# Patient Record
Sex: FEMALE | Race: BLACK OR AFRICAN AMERICAN | NOT HISPANIC OR LATINO | URBAN - METROPOLITAN AREA
[De-identification: names, ages, dates, MRNs, and addresses within clinical notes are randomized per-mention and may not be internally consistent; named-entity substitution may affect disease eponyms.]

---

## 2017-02-21 ENCOUNTER — IMPORTED ENCOUNTER (OUTPATIENT)
Dept: URBAN - METROPOLITAN AREA CLINIC 1 | Facility: CLINIC | Age: 82
End: 2017-02-21

## 2017-02-21 PROBLEM — Z96.1: Noted: 2017-02-21

## 2017-02-21 PROBLEM — H04.123: Noted: 2017-02-21

## 2017-02-21 PROBLEM — H40.1133: Noted: 2017-02-21

## 2017-02-21 PROBLEM — H26.491: Noted: 2017-02-21

## 2017-02-21 PROBLEM — H16.143: Noted: 2017-02-21

## 2017-02-21 PROCEDURE — 92012 INTRM OPH EXAM EST PATIENT: CPT

## 2017-02-21 NOTE — PATIENT DISCUSSION
1.  End Stage Open Angle Glaucoma OU- (.95 OD/ .85 OS)Slight increased IOP OU w/ partial compliance. H/o ALT OU. Patient to continue with Brimonidine OU TID Cosopt OU TID Latanoprost OU QHS. Compliance with meds discussed. 2.  BILLIE w/ PEK OU- Cont ATs TID OU Routinely. 3.  Pseudophakia OU- h/o YAG Cap OS 4. PCO OD- Stable. Observe  5. Return for an appointment for a 10 in 4 months with Dr. Lissette Holm.

## 2017-06-26 NOTE — PATIENT DISCUSSION
1.  Hyperopia OU- Rx for glasses given. 2.  COAG Suspect OU (0.7 OU) : Past w/u negative. Cont to observe. 3.  AVIS w/ PEK OU- Cont ATs BID OU Routinely. 4.  Cataract OU: Observe for now without intervention. The patient was advised to contact us if any change or worsening of visionReturn for an appointment in 1 yr 36 with Dr. Feng Haque.

## 2017-06-30 ENCOUNTER — IMPORTED ENCOUNTER (OUTPATIENT)
Dept: URBAN - METROPOLITAN AREA CLINIC 1 | Facility: CLINIC | Age: 82
End: 2017-06-30

## 2017-06-30 PROBLEM — H35.033: Noted: 2017-06-30

## 2017-06-30 PROBLEM — H40.1134: Noted: 2017-06-30

## 2017-06-30 PROCEDURE — 92012 INTRM OPH EXAM EST PATIENT: CPT

## 2017-06-30 NOTE — PATIENT DISCUSSION
1.  End Stage Open Angle Glaucoma OU- (.95 OD/ .85 OS) Slight increased IOP OU w/ still partial compliance and decrease in [place]. H/o ALT OU. Patient to continue with Brimonidine OU TID Cosopt OU TID Latanoprost OU QHS. Compliance with meds discussed. Risk of permanent vision loss discussed with patient due to noncompliance. Patient understood. 2.  BILLIE w/ PEK OU- Cont ATs TID OU Routinely. 3.  Pseudophakia OU- h/o YAG Cap OS 4. PCO OD- Stable. Observe  5. H/o CRVO OD/ PRP/ Focal Laser 6. 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 Serge Stone.

## 2018-01-16 NOTE — PATIENT DISCUSSION
1.  Chalazion right upper eyelid - on Tarsal plate. Hot compresses TID x 5 minutes for 3 weeks. If without improvement discussed with patient possible Incision and Drainage procedure. Risks and benefits discussed with patient and patient states full understanding. 2. Pinguecula OD - Begin Tobradex QID OD (erx). Advised using sunglasses when exposed to UV light. 3.  AVIS w/ PEK OU-The use/continuation of artificial tears were recommended. 4.  Cataract OU: Observe for now without intervention. The patient was advised to contact us if any change or worsening of vision5. COAG Suspect OU (0.7 OU) : Past w/u negative. Return for an appointment in 1 week 10 with Dr. Preciado.

## 2018-01-23 NOTE — PATIENT DISCUSSION
1.  Chalazion right upper eyelid - Improved. Hot compresses TID x 5 minutes for 3 weeks. If without improvement discussed with patient possible Incision and Drainage procedure. Risks and benefits discussed with patient and patient states full understanding. 2. Pinguecula OD -- Inflammation resolved. Decrease Tobradex QID OD x 2 weeks then DC. Use of sunglasses when exposed to UV light and observation is recommended. 3. AVIS w/ PEK OU- Recommend ATs BID-QID OU routinely 4. Cataract OU: Observe for now without intervention. The patient was advised to contact us if any change or worsening of vision5. COAG Suspect OU (0.7 OU) : Past w/u negative. Return for an appointment in 1 month 10 with Dr. Debbi Cruz.

## 2018-01-29 ENCOUNTER — IMPORTED ENCOUNTER (OUTPATIENT)
Dept: URBAN - METROPOLITAN AREA CLINIC 1 | Facility: CLINIC | Age: 83
End: 2018-01-29

## 2018-01-29 PROBLEM — Z96.1: Noted: 2018-01-29

## 2018-01-29 PROBLEM — H04.123: Noted: 2018-01-29

## 2018-01-29 PROBLEM — H40.1133: Noted: 2018-01-29

## 2018-01-29 PROBLEM — H16.143: Noted: 2018-01-29

## 2018-01-29 PROBLEM — H35.033: Noted: 2018-01-29

## 2018-01-29 PROBLEM — H26.493: Noted: 2018-01-29

## 2018-01-29 PROCEDURE — 92014 COMPRE OPH EXAM EST PT 1/>: CPT

## 2018-01-29 PROCEDURE — 92133 CPTRZD OPH DX IMG PST SGM ON: CPT

## 2018-01-29 NOTE — PATIENT DISCUSSION
1.  End Stage Open Angle Glaucoma OU (CD 0.95/0.85) - IOP elevated due to gtts on backorder. Cont Brimonidine OU TID Cosopt OU TID Latanoprost OU QHS. H/o ALT OU. Patient advised to be compliant with gtts. Condition was discussed with patient and patient understands. Will continue to monitor patient for any progression in condition. Patient was advised to call us with any problems questions or concerns. 2.  BILLIE w/ PEK OU-The use/continuation of artificial tears were recommended. 3.  PCO OU: (Posterior Capsule Opacification)   Observe and consider yag cap when pt feels pco visually significant and visual acuity decreases to appropriate level. 4. Pseudophakia OU 5.  GR II Hypertensive Retinopathy OU-Stable continue HTN Control6. H/o CRVO OD/ PRP/ Focal LaserReturn for an appointment in 3 months 10 with Dr. Neha Roca.

## 2018-02-20 NOTE — PATIENT DISCUSSION
1.  Chalazion right upper eyelid - Improved. Restart Tobradex BID OD (erx). Hot compresses TID x 5 minutes for 3 weeks. If without improvement discussed with patient possible Incision and Drainage procedure. Risks and benefits discussed with patient and patient states full understanding. 2. Pinguecula OD -- Use of sunglasses when exposed to UV light and observation is recommended. 3. AVIS w/ PEK OU- Recommend ATs BID OU routinely 4. Cataract OU: Observe for now without intervention. The patient was advised to contact us if any change or worsening of vision5. COAG Suspect OU (0.7 OU) : Past w/u negative. Return for an appointment in 1 month 10 (RUL tarsal plate check) with Dr. Emilee Dumont.

## 2018-05-01 ENCOUNTER — IMPORTED ENCOUNTER (OUTPATIENT)
Dept: URBAN - METROPOLITAN AREA CLINIC 1 | Facility: CLINIC | Age: 83
End: 2018-05-01

## 2018-05-01 PROBLEM — H40.1133: Noted: 2018-05-01

## 2018-05-01 PROBLEM — H04.123: Noted: 2018-05-01

## 2018-05-01 PROBLEM — Z96.1: Noted: 2018-05-01

## 2018-05-01 PROBLEM — H26.491: Noted: 2018-05-01

## 2018-05-01 PROBLEM — H16.143: Noted: 2018-05-01

## 2018-05-01 PROCEDURE — 92012 INTRM OPH EXAM EST PATIENT: CPT

## 2018-05-01 NOTE — PATIENT DISCUSSION
End Stage Open Angle Glaucoma OU (CD 0.95/0.85) - Stable IOP OU. AMA pt does not wish to use Dorzolamide due to cost. Cont Brimonidine OU TID Timolol OU BID Latanoprost OU QHS. H/o ALT OU. Patient advised to be compliant with gtts. Condition was discussed with patient and patient understands. Will continue to monitor patient for any progression in condition. Patient was advised to call us with any problems questions or concerns. 2.  BILLIE w/ PEK OU- Stable. The continuation of artificial tears were recommended. 3.  PCO OD: (Posterior Capsule Opacification)   Observe and consider yag cap when pt feels pco visually significant and visual acuity decreases to appropriate level. 4. Pseudophakia OU 5.  GR II Hypertensive Retinopathy OU-Stable continue HTN Control6. H/o CRVO OD/ PRP/ Focal Laser7. Return for an appointment for a dfe in 3 months with Dr. Katja Bernardo.

## 2018-05-01 NOTE — PATIENT DISCUSSION
PCO  OD (Posterior Capsule Opacification)  Observe and consider yag cap when pt feels pco visually significant and visual acuity decreases to appropriate level.

## 2018-06-28 NOTE — PATIENT DISCUSSION
1.  Hyperopia OU -- Finalized Glasses MRx was given to patient for corrective spectacles if indicated2. Presbyopia OU 3. Cataracts OU -- Observe 4. Dry Eyes OU -- Recommend the frequent use of OTC AT's BID-QID OU 5. Narrow Angles OU -- Not occludible  Return for an appointment in 1 YR for a 40 OU with Dr. Norman Peck. Return for an appointment in 7 MO for a 27 / Sergio Snipe / MRx / Lerry Esters with Dr. Norman Peck.

## 2018-08-08 ENCOUNTER — IMPORTED ENCOUNTER (OUTPATIENT)
Dept: URBAN - METROPOLITAN AREA CLINIC 1 | Facility: CLINIC | Age: 83
End: 2018-08-08

## 2018-08-08 PROBLEM — H40.1133: Noted: 2018-08-08

## 2018-08-08 PROCEDURE — 92012 INTRM OPH EXAM EST PATIENT: CPT

## 2018-08-08 NOTE — PATIENT DISCUSSION
Severe Open Angle Glaucoma OU -Patient to continue with current gtt regimen. Patient advised to be compliant with gtts. Condition was discussed with patient and patient understands. Will continue to monitor patient for any progression in condition. Patient was advised to call us with any problems questions or concerns.

## 2018-08-08 NOTE — PATIENT DISCUSSION
1.  End Stage Open Angle Glaucoma OU (CD 0.95/0.85) -- IOP 27 / 14 OU. pt does not wish to use Dorzolamide due to cost. Cont Brimonidine OU TID Timolol OU BID Latanoprost OU QHS. Patients  is in the hospital & feels may have missed gtts. Stressed Compliance. H/o ALT OU. Patient advised to be compliant with gtts. Condition was discussed with patient and patient understands. Will continue to monitor patient for any progression in condition. Patient was advised to call us with any problems questions or concerns. 2.  Possible Recent Sub-Conj Heme -- Resolved & not seen on today's exam 3. BILLIE w/ PEK OU- Stable. Recommend continuation of artificial tears were recommended. 4.  PCO OD: (Posterior Capsule Opacification)   Observe and consider yag cap when pt feels pco visually significant and visual acuity decreases to appropriate level. 5. Pseudophakia OU 6. GR II Hypertensive Retinopathy OU-Stable continue HTN Control7. H/o CRVO OD/ PRP/ Focal LaserReturn for an appointment in 3 MO for a 10 (IOP Check) with Dr. Durga Sandoval.

## 2019-01-22 NOTE — PATIENT DISCUSSION
1.  Narrow Angles OU (CD 0.7 OU) Discussed option of YAG PI vs. Phaco. Gonio performed today: OD Gonio: 30 degree angles SS in all quadrants. OS Gonio: 30 degree angles SS in all quadrants. 2.  Cataract OU:  Visually Significant secondary to glare: Patient also has Anatomic narrow angles; will defer YAG PI as patient is scheduling Phaco OU today. Discussed the risks benefits alternatives and limitations of cataract surgery. The patient stated a full understanding and a desire to proceed with the procedure. The patient will need to return for preop appointment with cataract measurements and to have any additional questions answered and start pre-operative eye drops as directed. Phaco PCL OS then OD *Probable MF candidate* (Otherwise follow-up 6 mo 10 glare) 3. Dry Eyes OU - Continue ATs TID OU Routinely. 4.  Glaucoma Suspect OU (CD 0.7 OU) Neg Fm Hx. Past w/u Neg. IOP 19 OU. Letter to Walt Russell for an appointment with Dr. Shirley Baker for AS/HP.

## 2019-02-18 NOTE — PATIENT DISCUSSION
1. Cataract OS:  Visually Significant secondary to glare discussed the risks benefits alternatives and limitations of cataract surgery. The patient stated a full understanding and a desire to proceed with the procedure. Discussed with patient and patient understands halos around lights and glare are expected post-op particularly at night with the MF lens choice. Pt understood. Phaco PCL OS (Mane MF) Return for an appointment for Return as scheduled with Dr. Carly Doty.

## 2019-02-28 NOTE — PATIENT DISCUSSION
POD#1 CE/IOL OS (Toric) doing well. Continue all 3 gtts as prescribed and until gone. Use Lotemax BID OS Prolensa Qdaily OS Ocuflox TID OS Post op Warnings Reiterated RTC as scheduled

## 2019-03-07 NOTE — PATIENT DISCUSSION
1.  Cataract OD -- Visually Significant secondary to glare discussed the risks benefits alternatives and limitations of cataract surgery. The patient stated a full understanding and a desire to proceed with the procedure. Pt understands they will need glasses post-op to achieve their best corrected vision. Phaco PCL OD Discussed with patient and patient understands halos around lights and glare are expected post-op particularly at night with the MF lens choice. Pt understood. Thoroughly discussed visual expectations post phaco with patient. Pt understood. 2.  POW#2 CE/IOL OS (Restor MF Toric) -- Doing well. F/u as scheduled for 2nd eye (OD) Phaco PCL Sx w/ Dr. Brooks Mars.

## 2019-03-14 NOTE — PATIENT DISCUSSION
1. POD#1 Phaco/ PCL OD (Restor Toric- [id number])- doing well. Continue all 3 gtts as prescribed and until gone. Use Inveltys BID OD Prolensa Qdaily OD Ocuflox TID OD Post op Warnings Reiterated 2. POW#2 Phaco/ PCL OS (Restor Toric [id number])- doing well Continue all 3 gtts as prescribed and until gone. Use Inveltys BID OS till out Use Prolensa Qdaily OS till out Post op Warnings Reiterated RTC as scheduled

## 2019-04-04 NOTE — PATIENT DISCUSSION
POW#3 Phaco/ PCL OU (Restor Toric- [id number]) doing well Finish PO meds per schedule Return for an appointment in 6 months 30with Dr. Raf Dixon.

## 2019-10-08 NOTE — PATIENT DISCUSSION
1.  PCO OU- Observe. 2.  AVIS w/ PEK OU- Recommend AT's TID OU routinely. 3.  Glaucoma Suspect OU : (CD 0.7 OU) Neg Fm Hx. Past w/u Neg. IOP stable. 4. Pseudophakia OU- (Restor Toric- [id number]) Letter to PCPPatient deferred Manifest Rx today. Return for an appointment in 1 year 27 with Dr. Amparo Doyle.

## 2020-10-08 NOTE — PATIENT DISCUSSION
1.  Glaucoma Suspect OU -- (CD 0.7 OU) Neg Fm Hx. Past w/u Neg. IOP stable. Cont to observe no gtts. 2.  PCO OU -- Visually Significant *secondary to glare*; schedule YAG Cap. Pros cons risks and benefits. 3.  AVIS w/ PEK OU -- Recommend AT's TID OU routinely. 4.  Pseudophakia OU -- (Restor Toric - [id number]) 5. NFLI OS -- Observe. Return for an appointment YAG Cap OD then OS with Dr. Percy Sotelo.

## 2021-01-04 NOTE — PATIENT DISCUSSION
PO YAG Cap OU -- good result. MRX given. Return for an appointment in October for 30 (no testing) with Dr. Safia Gay.

## 2021-05-04 ENCOUNTER — IMPORTED ENCOUNTER (OUTPATIENT)
Dept: URBAN - METROPOLITAN AREA CLINIC 1 | Facility: CLINIC | Age: 86
End: 2021-05-04

## 2021-05-04 PROBLEM — H40.1133: Noted: 2021-05-04

## 2021-05-04 PROBLEM — H16.143: Noted: 2021-05-04

## 2021-05-04 PROBLEM — H04.123: Noted: 2021-05-04

## 2021-05-04 PROCEDURE — 92133 CPTRZD OPH DX IMG PST SGM ON: CPT

## 2021-05-04 PROCEDURE — 99214 OFFICE O/P EST MOD 30 MIN: CPT

## 2021-05-04 NOTE — PATIENT DISCUSSION
1.  End Stage Open Angle Glaucoma OU (CD 0.95/0.85) - No progression by OCT OS unable to obtain OD. slight increase in IOP with questionable compliance. Advised to use Brimonidine OU TID Timolol OU BID Latanoprost OU QHS (written rx given for al 3). Patient advised to be compliant with gtts. Condition was discussed with patient and patient understands. Will continue to monitor patient for any progression in condition. Patient was advised to call us with any problems questions or concerns. 2.  BILLIE w/ PEK OU- Recommend ATs TID OU routinely 3. PCO OD: (Posterior Capsule Opacification)   Observe and consider yag cap when pt feels pco visually significant and visual acuity decreases to appropriate level. 4. Pseudophakia OU 5.  GR II Hypertensive Retinopathy OU-Stable continue HTN Control6. H/o CRVO OD/ PRP/ Focal LaserReturn for an appointment in 6 months 10 with Dr. Jennifer Dunbar.

## 2021-12-13 ENCOUNTER — IMPORTED ENCOUNTER (OUTPATIENT)
Dept: URBAN - METROPOLITAN AREA CLINIC 1 | Facility: CLINIC | Age: 86
End: 2021-12-13

## 2021-12-13 PROBLEM — H16.143: Noted: 2021-12-13

## 2021-12-13 PROBLEM — H40.1133: Noted: 2021-12-13

## 2021-12-13 PROBLEM — H04.123: Noted: 2021-12-13

## 2021-12-13 PROCEDURE — 92012 INTRM OPH EXAM EST PATIENT: CPT

## 2021-12-13 NOTE — PATIENT DISCUSSION
1.  End Stage Open Angle Glaucoma OU (CD 0.95/0.85) -- IOP stable. Cont Brimonidine OU TID Timolol OU BID Latanoprost OU QHS. Patient advised to be compliant with gtts. Condition was discussed with patient and patient understands. Will continue to monitor patient for any progression in condition. Patient was advised to call us with any problems questions or concerns. 2.  BILLIE w/ PEK OU - Recommend ATs TID OU routinely 3. PCO OD - (Posterior Capsule Opacification)   Observe and consider yag cap when pt feels pco visually significant and visual acuity decreases to appropriate level. 4. Pseudophakia OU 5.  GR II Hypertensive Retinopathy OU-Stable continue HTN Control6. H/o CRVO OD/ PRP/ Focal LaserReturn for an appointment in 4 months 30/OCT with Dr. Royer Bowens.

## 2022-01-25 ENCOUNTER — IMPORTED ENCOUNTER (OUTPATIENT)
Dept: URBAN - METROPOLITAN AREA CLINIC 1 | Facility: CLINIC | Age: 87
End: 2022-01-25

## 2022-01-25 PROBLEM — H11.31: Noted: 2022-01-25

## 2022-01-25 PROCEDURE — 99213 OFFICE O/P EST LOW 20 MIN: CPT

## 2022-01-25 NOTE — PATIENT DISCUSSION
1.  Subconjunctival hemorrhage OD -- Reassurance given. Condition discussed with patient. 2.  End Stage Open Angle Glaucoma OU (CD 0.95/0.85) -- IOP elevated OD with stated compliance stable OS. Cont Brimonidine TID OU Timolol BID OU and Latanoprost QHS OU. Patient advised to be compliant with gtts. Condition was discussed with patient and patient understands. Will continue to monitor patient for any progression in condition. Patient was advised to call us with any problems questions or concerns. 3.  BILLIE w/ PEK OU -- Recommend ATs TID OU routinely. 4.  PCO OD -- (Posterior Capsule Opacification) Observe. 5.  Pseudophakia OU -- H/o YAG Cap OS 6. H/o GR II Hypertensive Retinopathy OU 7. H/o CRVO OD/ PRP/ Focal LaserReturn for an appointment as scheduled with Dr. Tuan Greene.
Subconjunctival hemorrhage OD -- Reassurance given. Condition discussed with patient.
Yes

## 2022-04-02 ASSESSMENT — TONOMETRY
OS_IOP_MMHG: 17
OD_IOP_MMHG: 16
OD_IOP_MMHG: 27
OD_IOP_MMHG: 27
OS_IOP_MMHG: 13
OD_IOP_MMHG: 21
OS_IOP_MMHG: 15
OS_IOP_MMHG: 12
OD_IOP_MMHG: 25
OD_IOP_MMHG: 26
OD_IOP_MMHG: 38
OD_IOP_MMHG: 18
OS_IOP_MMHG: 18
OS_IOP_MMHG: 18
OS_IOP_MMHG: 16
OS_IOP_MMHG: 14

## 2022-04-02 ASSESSMENT — VISUAL ACUITY
OS_SC: 20/60-2
OS_CC: 20/80+1
OS_SC: 20/80+1
OS_CC: 20/200
OS_SC: 20/70-2
OS_SC: 20/70-1
OS_SC: 20/50
OS_SC: 20/200
OS_CC: J10+2
OS_CC: 20/70+2

## 2022-04-04 NOTE — PATIENT DISCUSSION
(CD 0.60 OU) Past w/u negative. IOP stable. Patient is considered low risk. Condition was discussed with patient and patient understands. Will continue to monitor patient for any progression in condition. Patient was advised to call us with any problems, questions, or concerns.

## 2023-10-23 ENCOUNTER — COMPREHENSIVE EXAM (OUTPATIENT)
Dept: URBAN - METROPOLITAN AREA CLINIC 1 | Facility: CLINIC | Age: 88
End: 2023-10-23

## 2023-10-23 DIAGNOSIS — H04.123: ICD-10-CM

## 2023-10-23 DIAGNOSIS — H40.1133: ICD-10-CM

## 2023-10-23 DIAGNOSIS — H26.491: ICD-10-CM

## 2023-10-23 PROCEDURE — 99214 OFFICE O/P EST MOD 30 MIN: CPT

## 2023-10-23 ASSESSMENT — TONOMETRY
OD_IOP_MMHG: 24
OS_IOP_MMHG: 19